# Patient Record
Sex: MALE | Race: WHITE | URBAN - METROPOLITAN AREA
[De-identification: names, ages, dates, MRNs, and addresses within clinical notes are randomized per-mention and may not be internally consistent; named-entity substitution may affect disease eponyms.]

---

## 2020-11-24 ENCOUNTER — OFFICE VISIT (OUTPATIENT)
Dept: URBAN - METROPOLITAN AREA CLINIC 48 | Facility: CLINIC | Age: 40
End: 2020-11-24
Payer: COMMERCIAL

## 2020-11-24 PROCEDURE — 92133 CPTRZD OPH DX IMG PST SGM ON: CPT | Performed by: OPHTHALMOLOGY

## 2020-11-24 PROCEDURE — 92020 GONIOSCOPY: CPT | Performed by: OPHTHALMOLOGY

## 2020-11-24 PROCEDURE — 92004 COMPRE OPH EXAM NEW PT 1/>: CPT | Performed by: OPHTHALMOLOGY

## 2020-11-24 PROCEDURE — 92083 EXTENDED VISUAL FIELD XM: CPT | Performed by: OPHTHALMOLOGY

## 2020-11-24 RX ORDER — LATANOPROST 50 UG/ML
0.005 % SOLUTION OPHTHALMIC
Qty: 2.5 | Refills: 6 | Status: INACTIVE
Start: 2020-11-24 | End: 2021-11-05

## 2020-11-24 ASSESSMENT — INTRAOCULAR PRESSURE
OD: 13
OS: 47

## 2020-11-24 ASSESSMENT — KERATOMETRY
OS: 47.50
OD: 46.75

## 2020-11-24 NOTE — ASSESSMENT/PLAN
Impression: Visual Field - OD: Good-normal; OS: Good-dense superior and inferior arcuate Plan: see plan

## 2020-11-24 NOTE — IMPRESSION/PLAN
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 Plan: Extremely high IOP OS, severe optic nerve damage. long discussion regarding prognosis. start Lataoprost qhs OS

IOP check in 1 week.

## 2020-12-03 ENCOUNTER — OFFICE VISIT (OUTPATIENT)
Dept: URBAN - METROPOLITAN AREA CLINIC 48 | Facility: CLINIC | Age: 40
End: 2020-12-03
Payer: COMMERCIAL

## 2020-12-03 PROCEDURE — 92012 INTRM OPH EXAM EST PATIENT: CPT | Performed by: OPHTHALMOLOGY

## 2020-12-03 PROCEDURE — 92134 CPTRZ OPH DX IMG PST SGM RTA: CPT | Performed by: OPHTHALMOLOGY

## 2020-12-03 RX ORDER — BRIMONIDINE TARTRATE, TIMOLOL MALEATE 2; 5 MG/ML; MG/ML
SOLUTION/ DROPS OPHTHALMIC
Qty: 5 | Refills: 6 | Status: INACTIVE
Start: 2020-12-03 | End: 2021-01-25

## 2020-12-03 NOTE — IMPRESSION/PLAN
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 Plan: IOP in OS not as high as before (47) but still very elevated (43) IOP did not seem to decrease as much as we were expecting. Recommend adding additional ocular drop to help reduce IOP. Patient to start: Combigan BID OS
continue Kelli QHS OS

 side effects of beta-blockers; dry eyes, red, stinging or painful eyes after using drops, blurry vision, breathing problems in people with asthma, emphysema, or COPD, a slow or irregular heartbeat, feeling tired, depression, dizziness Side effects of Alpha agonists; dry eye, red, stinging or painful eyes after using drops, blurry vision, allergy (redness, itching, tearing and swelling of the eye), a large (dilated) pupil, headaches, dry mouth, feeling tired, weak or dizzy, an increase in blood pressure, a fast or irregular heartbeat

## 2020-12-17 ENCOUNTER — OFFICE VISIT (OUTPATIENT)
Dept: URBAN - METROPOLITAN AREA CLINIC 48 | Facility: CLINIC | Age: 40
End: 2020-12-17
Payer: COMMERCIAL

## 2020-12-17 DIAGNOSIS — H20.812: Primary | ICD-10-CM

## 2020-12-17 PROCEDURE — 92012 INTRM OPH EXAM EST PATIENT: CPT | Performed by: OPHTHALMOLOGY

## 2020-12-17 RX ORDER — NETARSUDIL 0.2 MG/ML
0.02 % SOLUTION/ DROPS OPHTHALMIC; TOPICAL
Qty: 2.5 | Refills: 6 | Status: INACTIVE
Start: 2020-12-17 | End: 2021-11-05

## 2020-12-17 ASSESSMENT — INTRAOCULAR PRESSURE
OD: 22
OS: 38

## 2020-12-17 NOTE — IMPRESSION/PLAN
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 Plan: IOP slightly lower in OS @38, vs 43 at last visit. IOP very slowly decreasing. Discussed with patient that we are trying to use all drops possible to lower IOP, but  patient will likely need surgery or laser to help. Discussed risks/benefits to surgery. Continue Combigan BID OS, Latanoprost QHS OS Start: Rhopressa QHS OS - sample given today Common side effects of Rhopressa include: eye redness, corneal abnormalities, instillation site pain, and burst blood vessels in the eye.

## 2020-12-28 ENCOUNTER — OFFICE VISIT (OUTPATIENT)
Dept: URBAN - METROPOLITAN AREA CLINIC 48 | Facility: CLINIC | Age: 40
End: 2020-12-28
Payer: COMMERCIAL

## 2020-12-28 PROCEDURE — 92012 INTRM OPH EXAM EST PATIENT: CPT | Performed by: OPHTHALMOLOGY

## 2020-12-28 ASSESSMENT — INTRAOCULAR PRESSURE
OD: 17
OS: 33

## 2020-12-28 NOTE — IMPRESSION/PLAN
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 Plan: IOP much better in OD, slightly lower but still high in OS (33). Continue current regime at this time and monitor closely. 
Continue: Combigan BID OS, Kelli QHS OS, Rhopressa QHS OS

## 2021-01-19 ENCOUNTER — OFFICE VISIT (OUTPATIENT)
Dept: URBAN - METROPOLITAN AREA CLINIC 48 | Facility: CLINIC | Age: 41
End: 2021-01-19
Payer: COMMERCIAL

## 2021-01-19 PROCEDURE — 92020 GONIOSCOPY: CPT | Performed by: OPHTHALMOLOGY

## 2021-01-19 PROCEDURE — 99213 OFFICE O/P EST LOW 20 MIN: CPT | Performed by: OPHTHALMOLOGY

## 2021-01-19 RX ORDER — ACETAZOLAMIDE 250 MG/1
250 MG TABLET ORAL
Qty: 120 | Refills: 1 | Status: INACTIVE
Start: 2021-01-19 | End: 2021-08-10

## 2021-01-19 ASSESSMENT — INTRAOCULAR PRESSURE
OS: 52
OD: 17

## 2021-01-19 NOTE — IMPRESSION/PLAN
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 Plan: IOP very high in OS today @52. Advised patient at this point surgical intervention is strongly recommended:  Trabeculectomy w/ Express Shunt OS. Patient is expecting new baby and would like to wait until post-partum to do procedure. Will add additional IOP reducing medication and see if this helps. Continue: Kelli QHS OS, Rhopressa QHS OS, Combigan BID OS Start: Acetazolamide 250mg QID PO

## 2021-01-19 NOTE — IMPRESSION/PLAN
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  Plan: see plan 1

## 2021-01-28 ENCOUNTER — OFFICE VISIT (OUTPATIENT)
Dept: URBAN - METROPOLITAN AREA CLINIC 48 | Facility: CLINIC | Age: 41
End: 2021-01-28
Payer: COMMERCIAL

## 2021-01-28 PROCEDURE — 99213 OFFICE O/P EST LOW 20 MIN: CPT | Performed by: OPHTHALMOLOGY

## 2021-01-28 ASSESSMENT — INTRAOCULAR PRESSURE
OS: 25
OD: 15

## 2021-01-28 NOTE — IMPRESSION/PLAN
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 Plan: IOP much better, but still elevated OS. Informed patient he still needs to have surgery done, patient still waiting for wife to give birth Continue: Kelli QHS OS, Rhopressa QHS OS, Combigan BID OS, Acetazolamide 250mg QID PO

## 2021-08-10 ENCOUNTER — OFFICE VISIT (OUTPATIENT)
Dept: URBAN - METROPOLITAN AREA CLINIC 48 | Facility: CLINIC | Age: 41
End: 2021-08-10
Payer: COMMERCIAL

## 2021-08-10 DIAGNOSIS — H40.52X3 GLAUCOMA SECONDARY TO OTHER EYE DISORDERS, LEFT EYE, SEVERE STAGE: ICD-10-CM

## 2021-08-10 DIAGNOSIS — H21.262: Primary | ICD-10-CM

## 2021-08-10 PROCEDURE — 99213 OFFICE O/P EST LOW 20 MIN: CPT | Performed by: OPHTHALMOLOGY

## 2021-08-10 RX ORDER — OFLOXACIN 3 MG/ML
0.3 % SOLUTION/ DROPS OPHTHALMIC
Qty: 5 | Refills: 1 | Status: INACTIVE
Start: 2021-08-10 | End: 2021-12-07

## 2021-08-10 RX ORDER — PREDNISONE 20 MG/1
20 MG TABLET ORAL
Qty: 60 | Refills: 0 | Status: INACTIVE
Start: 2021-08-10 | End: 2021-08-23

## 2021-08-10 RX ORDER — PREDNISOLONE ACETATE 10 MG/ML
1 % SUSPENSION/ DROPS OPHTHALMIC
Qty: 5 | Refills: 3 | Status: INACTIVE
Start: 2021-08-10 | End: 2021-12-27

## 2021-08-10 ASSESSMENT — INTRAOCULAR PRESSURE: OS: 48

## 2021-08-10 NOTE — IMPRESSION/PLAN
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 Plan: IOP elevated in OS. Patient ready to have glaucoma surgery - Trabeculectomy w/ Express Shunt OS only. Discussed risks and benefits in detail with patient. Continue: Kelli QHS OS, Rhopressa QHS OS, Combigan BID OS Start: Prednisone 40mg Tab PO QD, Prednisolone QID OS - to start 1wk prior to procedure Recommend Trabeculectomy augmented with Express Shunt w/ mitomycin C for further lowering of intraocular pressure. Risks and benefits of the surgery explained. Patient understands that the purpose of the surgery is to lower eye pressure and not to improve vision. May need 5 fluorouracil subconjunctival injections post-operatively. Patient asked to consult with PCP or cardiologist and stop antiplatelet or anticoagulant medications 7 days prior to the surgery date if medically appropriate. Patient is to start Ofloxacin QID times a day one day prior to surgery. All potential side effects and benefits of medication discussed. Patient to bring in all drops to PO visits. Schedule Trabeculectomy w/ Express shunt w/ augmented with mitomycin C in LEFT eye, RL 2
patient instructed to use and take Pred for 1wk prior to surgery.

## 2021-08-18 ENCOUNTER — SURGERY (OUTPATIENT)
Dept: URBAN - METROPOLITAN AREA SURGERY 26 | Facility: SURGERY | Age: 41
End: 2021-08-18
Payer: COMMERCIAL

## 2021-08-18 PROCEDURE — 66170 GLAUCOMA SURGERY: CPT | Performed by: OPHTHALMOLOGY

## 2021-08-19 ENCOUNTER — POST-OPERATIVE VISIT (OUTPATIENT)
Dept: URBAN - METROPOLITAN AREA CLINIC 48 | Facility: CLINIC | Age: 41
End: 2021-08-19
Payer: COMMERCIAL

## 2021-08-19 PROCEDURE — 99024 POSTOP FOLLOW-UP VISIT: CPT | Performed by: OPHTHALMOLOGY

## 2021-08-19 ASSESSMENT — INTRAOCULAR PRESSURE: OS: 21

## 2021-08-19 NOTE — IMPRESSION/PLAN
Impression: S/P Trabeculectomy / EX-PRESS shunt/ Mitomycin C OS - 1 Day. Encounter for surgical aftercare following surgery on a sense organ  Z48.800. Plan: IOP better Use: Ofloxacin QID OS, Pred Q2hr OS Continue: Pred 40mg PO QD
d/c Kelli and Rhopressa RTC Monday IOP check/PO

## 2021-08-23 ENCOUNTER — POST-OPERATIVE VISIT (OUTPATIENT)
Dept: URBAN - METROPOLITAN AREA CLINIC 48 | Facility: CLINIC | Age: 41
End: 2021-08-23
Payer: COMMERCIAL

## 2021-08-23 PROCEDURE — 99024 POSTOP FOLLOW-UP VISIT: CPT | Performed by: OPHTHALMOLOGY

## 2021-08-23 RX ORDER — PREDNISONE 20 MG/1
20 MG TABLET ORAL
Qty: 60 | Refills: 0 | Status: INACTIVE
Start: 2021-08-23 | End: 2021-09-07

## 2021-08-23 ASSESSMENT — INTRAOCULAR PRESSURE: OS: 25

## 2021-08-23 NOTE — IMPRESSION/PLAN
Impression: S/P Trabeculectomy / EX-PRESS shunt/ Mitomycin C OS - 5 Days. Encounter for surgical aftercare following surgery on a sense organ  Z48.810. Plan: IOP elevated OS, 1 releasable suture removed, IOP @12 post suture removal
Continue: Pred Q2hr OS Use Ofloxacin QID for a couple more days then d/c Increase Pred to 60mg PO QD

RTC Thursday PO/IOP check

## 2021-08-26 ENCOUNTER — POST-OPERATIVE VISIT (OUTPATIENT)
Dept: URBAN - METROPOLITAN AREA CLINIC 48 | Facility: CLINIC | Age: 41
End: 2021-08-26
Payer: COMMERCIAL

## 2021-08-26 PROCEDURE — 99024 POSTOP FOLLOW-UP VISIT: CPT | Performed by: OPHTHALMOLOGY

## 2021-08-26 RX ORDER — ATROPINE SULFATE 10 MG/ML
1 % SOLUTION/ DROPS OPHTHALMIC
Qty: 5 | Refills: 0 | Status: INACTIVE
Start: 2021-08-26 | End: 2021-11-05

## 2021-08-26 ASSESSMENT — INTRAOCULAR PRESSURE: OS: 1

## 2021-08-26 NOTE — IMPRESSION/PLAN
Impression: S/P Trabeculectomy / EX-PRESS shunt/ Mitomycin C OS - 8 Days.  Encounter for surgical aftercare following surgery on a sense organ  Z48.810.
continue predforte every 2 hours, add atropine bid OS, continue oral prednisone 60 mg a day Plan: Follow up at 11 am tomorrow with Dr Krista Urbina

## 2021-08-26 NOTE — IMPRESSION/PLAN
Impression: S/P Trabeculectomy / EX-PRESS shunt/ Mitomycin C OS - 8 Days. Encounter for surgical aftercare following surgery on a sense organ  Z48.810. Plan: IOP very low now, possibly due to suture removed at last visit
will need to administer Provisc injection to inflate eye Start: Atropine BID OS Continue: Pred Q2hr OS, Pred 60mg PO QD

## 2021-08-27 ENCOUNTER — POST-OPERATIVE VISIT (OUTPATIENT)
Dept: URBAN - METROPOLITAN AREA CLINIC 48 | Facility: CLINIC | Age: 41
End: 2021-08-27
Payer: COMMERCIAL

## 2021-08-27 PROCEDURE — 99024 POSTOP FOLLOW-UP VISIT: CPT | Performed by: OPHTHALMOLOGY

## 2021-08-27 ASSESSMENT — INTRAOCULAR PRESSURE: OS: 16

## 2021-08-27 NOTE — IMPRESSION/PLAN
Impression: S/P Trabeculectomy / EX-PRESS shunt/ Mitomycin C OS - 9 Days. Encounter for surgical aftercare following surgery on a sense organ  Z48.280. Plan: IOP much better today Continue: Pred Q2H OS, Ofloxacin QID OS, Atropine BID OS, Oral Pred 60mg tab PO QD

RTC Monday IOP check /PO

## 2021-08-30 ENCOUNTER — POST-OPERATIVE VISIT (OUTPATIENT)
Dept: URBAN - METROPOLITAN AREA CLINIC 48 | Facility: CLINIC | Age: 41
End: 2021-08-30
Payer: COMMERCIAL

## 2021-08-30 PROCEDURE — 99024 POSTOP FOLLOW-UP VISIT: CPT | Performed by: OPHTHALMOLOGY

## 2021-08-30 ASSESSMENT — INTRAOCULAR PRESSURE: OS: 13

## 2021-08-30 NOTE — IMPRESSION/PLAN
Impression: S/P Trabeculectomy / EX-PRESS shunt/ Mitomycin C OS - 12 Days. Encounter for surgical aftercare following surgery on a sense organ  Z48.810. Plan: IOP steady, continues to be within good range d/c Ofloxacin at this time Reduce: Pred to 6x daily OS Continue: Atropine BID OS Start: Prednisone Tab taper 50mg x1wk, 40mg x1wk, 30mg x1wk, 20mg x1wk, 10mg x1wk, then d/c

RTC 1wk PO

## 2021-09-07 ENCOUNTER — POST-OPERATIVE VISIT (OUTPATIENT)
Dept: URBAN - METROPOLITAN AREA CLINIC 48 | Facility: CLINIC | Age: 41
End: 2021-09-07
Payer: COMMERCIAL

## 2021-09-07 PROCEDURE — 99024 POSTOP FOLLOW-UP VISIT: CPT | Performed by: OPHTHALMOLOGY

## 2021-09-07 RX ORDER — PREDNISONE 20 MG/1
20 MG TABLET ORAL
Qty: 60 | Refills: 0 | Status: INACTIVE
Start: 2021-09-07 | End: 2021-10-14

## 2021-09-07 ASSESSMENT — INTRAOCULAR PRESSURE: OS: 13

## 2021-09-07 NOTE — IMPRESSION/PLAN
Impression: S/P Trabeculectomy / EX-PRESS shunt/ Mitomycin C OS - 20 Days. Encounter for surgical aftercare following surgery on a sense organ  Z48.290. Plan: IOP at good range OD, continue to monitor d/c Atropine Continue: Pred 6x daily OS Continue to pater: Oral Prednisone 40mg x1wk, 30mg x1wk, 20mg x1wk, 10mg x1wk, then d/c

RTC 1wk PO

## 2021-09-14 ENCOUNTER — POST-OPERATIVE VISIT (OUTPATIENT)
Dept: URBAN - METROPOLITAN AREA CLINIC 48 | Facility: CLINIC | Age: 41
End: 2021-09-14
Payer: COMMERCIAL

## 2021-09-14 PROCEDURE — 99024 POSTOP FOLLOW-UP VISIT: CPT | Performed by: OPHTHALMOLOGY

## 2021-09-14 ASSESSMENT — INTRAOCULAR PRESSURE: OS: 16

## 2021-09-14 NOTE — IMPRESSION/PLAN
Impression: S/P Trabeculectomy / EX-PRESS shunt/ Mitomycin C OS - 27 Days. Encounter for surgical aftercare following surgery on a sense organ  Z48.810. Plan: subconjunctival 5 FU today, continue tapering predforte and oral prednisone. 


RTC next Thursday PM PO/IOP check

## 2021-09-27 ENCOUNTER — POST-OPERATIVE VISIT (OUTPATIENT)
Dept: URBAN - METROPOLITAN AREA CLINIC 48 | Facility: CLINIC | Age: 41
End: 2021-09-27
Payer: COMMERCIAL

## 2021-09-27 PROCEDURE — 99024 POSTOP FOLLOW-UP VISIT: CPT | Performed by: OPHTHALMOLOGY

## 2021-09-27 ASSESSMENT — INTRAOCULAR PRESSURE: OS: 15

## 2021-09-27 NOTE — IMPRESSION/PLAN
Impression: S/P Trabeculectomy / EX-PRESS shunt/ Mitomycin C OS - 40 Days. Encounter for surgical aftercare following surgery on a sense organ  Z48.600.  Plan: IOP stable in OS
start Prednisolone taper 3x2x1x then d/c
continue: Oral Pred taper 20mg x1wk, 10mg x1wk, 5mg 1wk then d/c

RTC 3wk PO/IOP check

## 2021-10-14 ENCOUNTER — POST-OPERATIVE VISIT (OUTPATIENT)
Dept: URBAN - METROPOLITAN AREA CLINIC 48 | Facility: CLINIC | Age: 41
End: 2021-10-14
Payer: COMMERCIAL

## 2021-10-14 PROCEDURE — 99024 POSTOP FOLLOW-UP VISIT: CPT | Performed by: STUDENT IN AN ORGANIZED HEALTH CARE EDUCATION/TRAINING PROGRAM

## 2021-10-14 RX ORDER — PREDNISONE 20 MG/1
20 MG TABLET ORAL
Qty: 60 | Refills: 0 | Status: INACTIVE
Start: 2021-10-14 | End: 2022-01-26

## 2021-10-14 ASSESSMENT — INTRAOCULAR PRESSURE
OS: 2
OS: 47

## 2021-10-14 NOTE — IMPRESSION/PLAN
Impression: S/P Trabeculectomy / EX-PRESS shunt/ Mitomycin C OS - 57 Days. Encounter for surgical aftercare following surgery on a sense organ  Z48.810. Post operative instructions reviewed - Plan: IOP elevated today at 47.
1 suture removed in office today. Pressure decreased to 7. Discussed with the patient. Start Prednisolone 6 times daily.  Start Atropine BID OS

RTC this afternoon for PO (IOP Check)

## 2021-10-14 NOTE — IMPRESSION/PLAN
Impression: S/P Trabeculectomy / EX-PRESS shunt/ Mitomycin C OS - 57 Days. Encounter for surgical aftercare following surgery on a sense organ  Z48.810.
sub conj few 5 mg/.1  ml Plan: OS Increase 40 mg. Pred. Increase PF gtts. for IOP to Q2H Re-start Atropine BID 

-Discussed R/B/A inj. to prevent scarring

## 2021-10-18 ENCOUNTER — POST-OPERATIVE VISIT (OUTPATIENT)
Dept: URBAN - METROPOLITAN AREA CLINIC 48 | Facility: CLINIC | Age: 41
End: 2021-10-18
Payer: COMMERCIAL

## 2021-10-18 PROCEDURE — 99024 POSTOP FOLLOW-UP VISIT: CPT | Performed by: OPHTHALMOLOGY

## 2021-10-18 ASSESSMENT — INTRAOCULAR PRESSURE: OS: 54

## 2021-10-18 NOTE — IMPRESSION/PLAN
Impression: S/P Trabeculectomy / EX-PRESS shunt/ Mitomycin C OS - 61 Days. Encounter for surgical aftercare following surgery on a sense organ  Z48.410. Plan: IOP continues to be elevated in OS - 54 OS Advised patient Bleb is scarring down, recommend redoing needling and doing 5fu inj
Bleb needling w/ 5FU inj - IOP @5 post
d/c Atropine Continue: Pred Q2HR OS, Pred 40mg tab PO Restart: Ofloxacin BID OS

RTC Thursday PO/IOP Check

## 2021-10-20 ENCOUNTER — POST-OPERATIVE VISIT (OUTPATIENT)
Dept: URBAN - METROPOLITAN AREA CLINIC 48 | Facility: CLINIC | Age: 41
End: 2021-10-20
Payer: COMMERCIAL

## 2021-10-20 PROCEDURE — 99024 POSTOP FOLLOW-UP VISIT: CPT | Performed by: OPHTHALMOLOGY

## 2021-10-20 ASSESSMENT — INTRAOCULAR PRESSURE: OS: 55

## 2021-10-20 NOTE — IMPRESSION/PLAN
Impression: S/P Trabeculectomy / EX-PRESS shunt/ Mitomycin C OS - 63 Days. Encounter for surgical aftercare following surgery on a sense organ  Z48.810. Plan: IOP continues to be elevated OS post Trab Discussed revising/redoing surgery Continue: Pred 6x daily OS, Pred 40mg tab PO Restart: Combigan BID OS, Latanoprost QHS OS, Acetazolamide 250mg tab PO Revision of Trabeculectomy OS w/ [address] - to be done next week Risk of further corneal decompensation. Risk of infection, bleeding and rarely completely losing the eye.  
Please Schedule Trabeculectomy w/ [address]; revision LEFT EYE RL 2

## 2021-10-25 ENCOUNTER — POST-OPERATIVE VISIT (OUTPATIENT)
Dept: URBAN - METROPOLITAN AREA CLINIC 48 | Facility: CLINIC | Age: 41
End: 2021-10-25
Payer: COMMERCIAL

## 2021-10-25 PROCEDURE — 99024 POSTOP FOLLOW-UP VISIT: CPT | Performed by: STUDENT IN AN ORGANIZED HEALTH CARE EDUCATION/TRAINING PROGRAM

## 2021-10-25 ASSESSMENT — INTRAOCULAR PRESSURE: OS: 53

## 2021-10-25 NOTE — IMPRESSION/PLAN
Impression: S/P Trabeculectomy / EX-PRESS shunt/ Mitomycin C OS - 68 Days. Encounter for surgical aftercare following surgery on a sense organ  Z48.352. Plan: Continue: 
Pred forte 6x daily os
combigan bid os Latanoprost qhs os Prednisolone 40mg qd po Diamox 250mg qid po Restart ABX Oflox 4xday

## 2021-10-27 ENCOUNTER — POST-OPERATIVE VISIT (OUTPATIENT)
Dept: URBAN - METROPOLITAN AREA CLINIC 48 | Facility: CLINIC | Age: 41
End: 2021-10-27
Payer: COMMERCIAL

## 2021-10-27 PROCEDURE — 99024 POSTOP FOLLOW-UP VISIT: CPT | Performed by: OPHTHALMOLOGY

## 2021-10-27 ASSESSMENT — INTRAOCULAR PRESSURE: OS: 42

## 2021-11-01 ENCOUNTER — POST-OPERATIVE VISIT (OUTPATIENT)
Dept: URBAN - METROPOLITAN AREA CLINIC 48 | Facility: CLINIC | Age: 41
End: 2021-11-01
Payer: COMMERCIAL

## 2021-11-01 DIAGNOSIS — Z48.810 ENCOUNTER FOR SURGICAL AFTERCARE FOLLOWING SURGERY ON A SENSE ORGAN: Primary | ICD-10-CM

## 2021-11-01 PROCEDURE — 99024 POSTOP FOLLOW-UP VISIT: CPT | Performed by: OPHTHALMOLOGY

## 2021-11-01 ASSESSMENT — INTRAOCULAR PRESSURE: OS: 52

## 2021-11-01 NOTE — IMPRESSION/PLAN
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 Plan: Will schedule Tube shunt Placement 
possibility of using one of three shunts, Baeverldt, Molteno and Ahmed. Risks and benefits of the surgery explained. Patient understands that the purpose of the surgery is to lower eye pressure and not to improve vision. Patient asked to consult with PCP or cardiologist and stop antiplatelet or anticoagulant medications 7 days prior to the surgery date if medically appropriate. Patient is to start Prednisolone QID and Ofloxacin QID times a day one day prior to surgery. All potential side effects and benefits of medication discussed. Patient to bring in all drops to PO visits. 

Schedule Tube shunt surgery+/- scleral reinforcement in LEFT eye, RL2

## 2021-11-01 NOTE — IMPRESSION/PLAN
Impression: S/P Bleb needling w/ 5FU inj OS - 14 Days. Encounter for surgical aftercare following surgery on a sense organ  Z48.810. Plan: IOP continues elevated in OS Advised patient that blood vessel running across OS is very large May need to do different surgery - repeat Trabeculectomy in other side. Discussed risk of same thing happening to this side Continue: Pred 6x daily OD, Combigan BID OS, Latanoprost QHS OS Continue Oral: Pred 40mg QD PO, Diamox 250mg QID PO Will schedule Tube shunt Placement 
possibility of using one of three shunts, Baeverldt, Molteno and Ahmed. Risks and benefits of the surgery explained. Patient understands that the purpose of the surgery is to lower eye pressure and not to improve vision. Patient asked to consult with PCP or cardiologist and stop antiplatelet or anticoagulant medications 7 days prior to the surgery date if medically appropriate. Patient is to start Prednisolone QID and Ofloxacin QID times a day one day prior to surgery. All potential side effects and benefits of medication discussed. Patient to bring in all drops to PO visits. 

Schedule Tube shunt surgery+/- scleral reinforcement in LEFT eye, RL2

## 2021-11-02 ENCOUNTER — SURGERY (OUTPATIENT)
Dept: URBAN - METROPOLITAN AREA SURGERY 26 | Facility: SURGERY | Age: 41
End: 2021-11-02
Payer: COMMERCIAL

## 2021-11-02 PROCEDURE — 66180 AQUEOUS SHUNT EYE W/GRAFT: CPT | Performed by: OPHTHALMOLOGY

## 2021-11-03 ENCOUNTER — POST-OPERATIVE VISIT (OUTPATIENT)
Dept: URBAN - METROPOLITAN AREA CLINIC 48 | Facility: CLINIC | Age: 41
End: 2021-11-03
Payer: COMMERCIAL

## 2021-11-03 PROCEDURE — 99024 POSTOP FOLLOW-UP VISIT: CPT | Performed by: OPHTHALMOLOGY

## 2021-11-03 RX ORDER — HYDROCODONE BITARTRATE AND ACETAMINOPHEN 5; 325 MG/1; MG/1
TABLET ORAL
Qty: 20 | Refills: 0 | Status: ACTIVE
Start: 2021-11-03

## 2021-11-03 ASSESSMENT — INTRAOCULAR PRESSURE: OS: 41

## 2021-11-03 NOTE — IMPRESSION/PLAN
Croup    Your toddler has a harsh cough that gets worse in the evening. Now shes woken up gasping for air. Chances are she has croup. This is an infection of the voice box (larynx) and windpipe (trachea). Croup causes the airways to swell, making it hard to breathe. It also causes a cough that can sound something like a seal barking.  Causes of croup  Croup mainly affects children between 6 months and 3 years of age, especially children younger than 2 years. But it can occur up to age 6. Older children have larger airways, so swelling isnt as likely to affect their breathing. Croup often follows a cold. It is usually caused by a virus and is most common between October and March.  When to go the emergency department  Mild croup can usually be treated at home with the home care methods listed below. Call your health care provider right away if you suspect croup. Take your child to the ER or a special emergency respiratory clinic if he or she has moderate to severe croup. And seek emergency care if youre worried, or if your child:  · Makes a whistling sound (stridor) that becomes louder with each breath.  · Has stridor when resting  · Has a hard time swallowing his or her saliva or drools  · Has increased difficulty breathing  · Has a blue or dusky color around the fingernails, mouth, or nose  · Struggles to catch his or her breath  · Can't speak or make sounds  What to expect in the emergency department  A doctor will ask about your childs health history and listen to his or her breathing. Your child may be given a medicine that usually relieves swollen airways and other symptoms. In rare cases, the doctor may use a tube to help your child breathe.  Home care for croup  Croup can sound frightening. But in many cases, the following tips can help ease your child's breathing:  · Don't let anyone smoke in your home. Smoke can make your child's cough worse.  · Keep your child's head raised. Prop an older child up in  Impression: S/P Shunt:  Ahmed; CorneaGen; Scleral reinforcement OS - 1 Day. Encounter for surgical aftercare following surgery on a sense organ  Z48.810. Plan: IOP continues to be elevated post Shunt placement AC tap and Viscoelastic removed Use: Pred 6x daily OD, Ofloxacin QID OD
D/c: Combigan, Latanoprost
Oral Med: Pred 40mg QD PO Reduce: Diamox to 125mg QID PO "bed with extra pillows. Put an infant in a car seat. Never use pillows with an infant younger than 12 months old.  · Sleep in the same room as your child while he or she is sick. You will be able to help your child right away if he or she has trouble breathing.  · Stay calm. If your child sees that you are frightened, this will make your child more anxious and make it harder for him or her to breathe.  · Offer words of comfort such as "It will be OK. I'm right here with you."  · Sing your child's favorite bedtime song.  · Offer a back rub or hold your child.  · Offer a favorite toy.  If the above tips don't help your child's breathing, you may try having your child breathe in steam from a shower or cool, moist night air. According to the American Academy of Pediatrics and the American Academy of Family Physicians, no studies prove that inhaling steam or moist air helps a child's breathing. But other medical experts still support this approach. Here's what to do:  · Turn on the hot water in your bathroom shower.  · Keep the door closed, so the room gets steamy.  · Sit with your child in the steam for 15 or 20 minutes. Don't leave your child alone.  · If your child wakes up at night, you can take him or her outdoors to breathe in cool night air. Make sure to wrap your child in warm clothing or blankets if the weather is chilly.   Date Last Reviewed: 10/1/2016  © 5618-0023 [employer], [employer]. [address] [place] [address], [place] [address]. All rights reserved. This information is not intended as a substitute for professional medical care. Always follow your healthcare professional's instructions.        "

## 2021-11-04 ENCOUNTER — POST-OPERATIVE VISIT (OUTPATIENT)
Dept: URBAN - METROPOLITAN AREA CLINIC 48 | Facility: CLINIC | Age: 41
End: 2021-11-04
Payer: COMMERCIAL

## 2021-11-04 PROCEDURE — 99024 POSTOP FOLLOW-UP VISIT: CPT | Performed by: OPHTHALMOLOGY

## 2021-11-04 ASSESSMENT — INTRAOCULAR PRESSURE
OS: 34
OS: 50
OS: 15
OS: 22
OS: 27

## 2021-11-04 NOTE — IMPRESSION/PLAN
Impression: S/P Shunt:  Ahmed; CorneaGen; Scleral reinforcement OS - 2 Days. Encounter for surgical aftercare following surgery on a sense organ  Z48.810. Plan: IOP elevated OS, better after burp - made slightly larger opening to allow fluid to drain Ocular surface irritated due to all drops being used Reduce: Pred to QID OD
d/c Ofloxacin Continue Oral: Pred 40mg PO, Diamox 125mg QID PO
IOP @ 28 post burp, then @17 Will have patient remain in clinic for IOP check in 30min, then 1hr 

Discussed possibly going to ER to have Manitol infusion to help reduce IOP.

## 2021-11-05 ENCOUNTER — POST-OPERATIVE VISIT (OUTPATIENT)
Dept: URBAN - METROPOLITAN AREA CLINIC 48 | Facility: CLINIC | Age: 41
End: 2021-11-05
Payer: COMMERCIAL

## 2021-11-05 PROCEDURE — 99024 POSTOP FOLLOW-UP VISIT: CPT | Performed by: STUDENT IN AN ORGANIZED HEALTH CARE EDUCATION/TRAINING PROGRAM

## 2021-11-05 NOTE — IMPRESSION/PLAN
Impression: S/P Shunt:  Ahmed; CorneaGen; Scleral reinforcement OS - 3 Days. Encounter for surgical aftercare following surgery on a sense organ  Z48.810. Plan: [facility] IOP 1st check 1 4  2nd  18 Tonopen 1st 20, 2nd 21, 3rd 20 Continue curerrent regimen. OS
PF 6X a day OFLOX QID Diamox QID PO Prednisone 40mg PO QD

RTC Monday with Dr. Bowers Bitter

## 2021-11-08 ENCOUNTER — POST-OPERATIVE VISIT (OUTPATIENT)
Dept: URBAN - METROPOLITAN AREA CLINIC 48 | Facility: CLINIC | Age: 41
End: 2021-11-08
Payer: COMMERCIAL

## 2021-11-08 PROCEDURE — 99024 POSTOP FOLLOW-UP VISIT: CPT | Performed by: OPHTHALMOLOGY

## 2021-11-08 ASSESSMENT — INTRAOCULAR PRESSURE: OS: 26

## 2021-11-08 NOTE — IMPRESSION/PLAN
Impression: S/P Shunt:  Ahmed; CorneaGen; Scleral reinforcement OS - 6 Days. Encounter for surgical aftercare following surgery on a sense organ  Z48.810.  Post operative instructions reviewed - Condition is improving -

-Combigan bid OS
- Prednisone 30 mg x 1 week then 20 mg x 1 week then 
-PF 6 times a say OS Plan: RTC Wednesday  PO

## 2021-11-10 ENCOUNTER — POST-OPERATIVE VISIT (OUTPATIENT)
Dept: URBAN - METROPOLITAN AREA CLINIC 48 | Facility: CLINIC | Age: 41
End: 2021-11-10
Payer: COMMERCIAL

## 2021-11-10 PROCEDURE — 99024 POSTOP FOLLOW-UP VISIT: CPT | Performed by: OPHTHALMOLOGY

## 2021-11-10 ASSESSMENT — INTRAOCULAR PRESSURE: OS: 10

## 2021-11-10 NOTE — IMPRESSION/PLAN
Impression: S/P Shunt:  Ahmed; CorneaGen; Scleral reinforcement OS - 8 Days. Encounter for surgical aftercare following surgery on a sense organ  Z48.810. Plan: IOP improvement OS, better d/c Combigan Reduce: Pred to QID OS Continue: Prednisone PO taper, Ofloxacin QID OS Start: Atropine BID OS
no vigorous activity RTC Friday AM PO

## 2021-11-12 ENCOUNTER — POST-OPERATIVE VISIT (OUTPATIENT)
Dept: URBAN - METROPOLITAN AREA CLINIC 48 | Facility: CLINIC | Age: 41
End: 2021-11-12
Payer: COMMERCIAL

## 2021-11-12 PROCEDURE — 99024 POSTOP FOLLOW-UP VISIT: CPT | Performed by: OPHTHALMOLOGY

## 2021-11-12 ASSESSMENT — INTRAOCULAR PRESSURE: OS: 18

## 2021-11-12 NOTE — IMPRESSION/PLAN
Impression: S/P Shunt:  Ahmed; CorneaGen; Scleral reinforcement OS - 10 Days. Encounter for surgical aftercare following surgery on a sense organ  Z48.810. Plan: IOP within acceptable range OS, but seems to be slowly building up Will continue same treatment plan
Continue: Atropine BID OS, Pred 6x daily OS, Pred 30mg PO QD
d/c Ofloxacin RTC Monday for IOP check

## 2021-11-15 ENCOUNTER — POST-OPERATIVE VISIT (OUTPATIENT)
Dept: URBAN - METROPOLITAN AREA CLINIC 48 | Facility: CLINIC | Age: 41
End: 2021-11-15
Payer: COMMERCIAL

## 2021-11-15 PROCEDURE — 99024 POSTOP FOLLOW-UP VISIT: CPT | Performed by: OPHTHALMOLOGY

## 2021-11-15 ASSESSMENT — INTRAOCULAR PRESSURE
OS: 13
OD: 24

## 2021-11-15 NOTE — IMPRESSION/PLAN
Impression: S/P Shunt:  Ahmed; CorneaGen; Scleral reinforcement OS - 13 Days. Encounter for surgical aftercare following surgery on a sense organ  Z48.810. Plan: IOP increased in OD, within good range in OS
possible steroid response in OD due to being on Pred
dehiscence noted in conjunctiva, advised to use shield while sleeping Use: Combigan BID OD - while on Pred Reduce: Pred [id number] Continue: Oral Prednisone Taper 20mg PO, Atropine Use: Ofloxacin BID OS
RTC Wednesday AM IOP/PO check

## 2021-11-17 ENCOUNTER — POST-OPERATIVE VISIT (OUTPATIENT)
Dept: URBAN - METROPOLITAN AREA CLINIC 48 | Facility: CLINIC | Age: 41
End: 2021-11-17
Payer: COMMERCIAL

## 2021-11-17 PROCEDURE — 99024 POSTOP FOLLOW-UP VISIT: CPT | Performed by: OPHTHALMOLOGY

## 2021-11-17 RX ORDER — TOBRAMYCIN AND DEXAMETHASONE 3; 1 MG/G; MG/G
OINTMENT OPHTHALMIC
Qty: 3.5 | Refills: 2 | Status: INACTIVE
Start: 2021-11-17 | End: 2021-12-16

## 2021-11-17 NOTE — IMPRESSION/PLAN
Impression: S/P Shunt:  Ahmed; CorneaGen; Scleral reinforcement OS - 15 Days. Encounter for surgical aftercare following surgery on a sense organ  Z48.810. Plan: IOP within good range OU
blepharitis noted OS, advised to do warm compress BID OS Reduce: Atropine to QD OS Continue taper: Pred [id number], Oral Pred by 5mg weekly - currently on Pred 20mg PO Start: Tobradex QHS OS Continue: Ofloxacin BID OS Continue in OD: Combigan BID OD

RTC 1wk w/ Dr. Charlee Weber **continue Pred taper, if AC well formed, consider stopping Atropine

## 2021-11-23 ENCOUNTER — POST-OPERATIVE VISIT (OUTPATIENT)
Dept: URBAN - METROPOLITAN AREA CLINIC 48 | Facility: CLINIC | Age: 41
End: 2021-11-23
Payer: COMMERCIAL

## 2021-11-23 PROCEDURE — 99024 POSTOP FOLLOW-UP VISIT: CPT | Performed by: STUDENT IN AN ORGANIZED HEALTH CARE EDUCATION/TRAINING PROGRAM

## 2021-11-23 ASSESSMENT — INTRAOCULAR PRESSURE
OD: 20
OS: 18

## 2021-11-23 NOTE — IMPRESSION/PLAN
Impression: S/P Shunt:  Ahmed; CorneaGen; Scleral reinforcement OS - 21 Days. Encounter for surgical aftercare following surgery on a sense organ  Z48.810. Plan: Left eye is doing well. Will continue current regimen. OS: 
Continue Atropine QD Continue  Pred taper QID Continue  Tobradex QD  

PO Continue Pred 10mg RTC 1-2 wks.

## 2021-11-29 ENCOUNTER — POST-OPERATIVE VISIT (OUTPATIENT)
Dept: URBAN - METROPOLITAN AREA CLINIC 48 | Facility: CLINIC | Age: 41
End: 2021-11-29
Payer: COMMERCIAL

## 2021-11-29 PROCEDURE — 99024 POSTOP FOLLOW-UP VISIT: CPT | Performed by: STUDENT IN AN ORGANIZED HEALTH CARE EDUCATION/TRAINING PROGRAM

## 2021-11-29 ASSESSMENT — INTRAOCULAR PRESSURE
OD: 15
OS: 12

## 2021-11-29 NOTE — IMPRESSION/PLAN
Impression: S/P Shunt:  Ahmed; CorneaGen; Scleral reinforcement OS - 27 Days. Encounter for surgical aftercare following surgery on a sense organ  Z48.810. Plan: Slight increase risk of infection will increase Oflox QID, no wound leak appreciate w no pressure but mild pressure can cause small leak. OS Continue Atropine QD, Pred TID  and Oral Pred 5 mg
- discussed w/pt. dilation will persist for at lest 2 wks. after d/c
 

RTC 1 wk. w/Dr. Emelia Lawson or Dr. Gaviota Shukla if he is not in clinic RTC 2 wks.  w/Dr. Emelia Lawson

## 2021-12-07 ENCOUNTER — POST-OPERATIVE VISIT (OUTPATIENT)
Dept: URBAN - METROPOLITAN AREA CLINIC 48 | Facility: CLINIC | Age: 41
End: 2021-12-07
Payer: COMMERCIAL

## 2021-12-07 PROCEDURE — 99024 POSTOP FOLLOW-UP VISIT: CPT | Performed by: STUDENT IN AN ORGANIZED HEALTH CARE EDUCATION/TRAINING PROGRAM

## 2021-12-07 RX ORDER — OFLOXACIN 3 MG/ML
0.3 % SOLUTION/ DROPS OPHTHALMIC
Qty: 5 | Refills: 1 | Status: INACTIVE
Start: 2021-12-07 | End: 2021-12-30

## 2021-12-07 ASSESSMENT — INTRAOCULAR PRESSURE
OD: 16
OS: 21

## 2021-12-07 NOTE — IMPRESSION/PLAN
Impression: S/P Shunt:  Ahmed; CorneaGen; Scleral reinforcement OS - 35 Days. Encounter for surgical aftercare following surgery on a sense organ  Z48.810. Plan: OS Continue current regimen, discussed w/pt. current trace wound leak w/pressure. Overall, IOP and vision doing well. PF TID Atropine QD Oflox QID  (refill sent to pharmacy) OD Combigan BID 

PO
prednisone 5mg

## 2021-12-16 ENCOUNTER — POST-OPERATIVE VISIT (OUTPATIENT)
Dept: URBAN - METROPOLITAN AREA CLINIC 48 | Facility: CLINIC | Age: 41
End: 2021-12-16
Payer: COMMERCIAL

## 2021-12-16 PROCEDURE — 99024 POSTOP FOLLOW-UP VISIT: CPT | Performed by: OPHTHALMOLOGY

## 2021-12-16 ASSESSMENT — INTRAOCULAR PRESSURE
OD: 22
OS: 21

## 2021-12-16 NOTE — IMPRESSION/PLAN
Impression: S/P Shunt:  Ahmed; CorneaGen; Scleral reinforcement OS - 44 Days. Encounter for surgical aftercare following surgery on a sense organ  Z48.810. Plan: IOP within acceptable range OU Advised patient to d/c Atropine, Combigan Continue: Pred QD OS Start: Latanoprost QHS OD Reduce: Ofloxacin to QD OS

RTC 2wk PO

## 2021-12-30 ENCOUNTER — POST-OPERATIVE VISIT (OUTPATIENT)
Dept: URBAN - METROPOLITAN AREA CLINIC 48 | Facility: CLINIC | Age: 41
End: 2021-12-30
Payer: COMMERCIAL

## 2021-12-30 PROCEDURE — 99024 POSTOP FOLLOW-UP VISIT: CPT | Performed by: OPHTHALMOLOGY

## 2021-12-30 RX ORDER — BRIMONIDINE TARTRATE, TIMOLOL MALEATE 2; 5 MG/ML; MG/ML
SOLUTION/ DROPS OPHTHALMIC
Qty: 5 | Refills: 4 | Status: INACTIVE
Start: 2021-12-30 | End: 2022-01-05

## 2021-12-30 NOTE — IMPRESSION/PLAN
Impression: S/P Shunt:  Ahmed; CorneaGen; Scleral reinforcement OS - 58 Days. Encounter for surgical aftercare following surgery on a sense organ  Z48.810. Plan: IOP within good range OD, slightly elevated in OS
discussed options with patient: removing prolene stent vs adding IOP lowering drops Start: Combigan QHS OU
d/c Ofloxacin Continue: Pred QD OS Finish: Latanoprost QHS OD, then switch to Combigan

## 2022-01-26 ENCOUNTER — POST-OPERATIVE VISIT (OUTPATIENT)
Dept: URBAN - METROPOLITAN AREA CLINIC 48 | Facility: CLINIC | Age: 42
End: 2022-01-26
Payer: COMMERCIAL

## 2022-01-26 PROCEDURE — 99024 POSTOP FOLLOW-UP VISIT: CPT | Performed by: OPHTHALMOLOGY

## 2022-01-26 RX ORDER — LATANOPROST 50 UG/ML
0.005 % SOLUTION OPHTHALMIC
Qty: 2.5 | Refills: 6 | Status: ACTIVE
Start: 2022-01-26

## 2022-01-26 ASSESSMENT — INTRAOCULAR PRESSURE
OD: 17
OS: 21

## 2022-01-26 NOTE — IMPRESSION/PLAN
Impression: S/P Shunt:  Ahmed; CorneaGen; Scleral reinforcement OS - 85 Days. Encounter for surgical aftercare following surgery on a sense organ  Z48.810. Plan: IOP within good range OS Advised patient it is OK to remove stent now, may help reduce IOP Patient agrees with plan Use: Latanoprost QHS OS
d/c  Combigan to OS, continue BID OD Restart: Ofloxacin BID OS - due to exposed tube RTC Tuesday Follow up, stent removal

## 2022-02-01 ENCOUNTER — POST-OPERATIVE VISIT (OUTPATIENT)
Dept: URBAN - METROPOLITAN AREA CLINIC 48 | Facility: CLINIC | Age: 42
End: 2022-02-01
Payer: COMMERCIAL

## 2022-02-01 PROCEDURE — 99024 POSTOP FOLLOW-UP VISIT: CPT | Performed by: OPHTHALMOLOGY

## 2022-02-01 ASSESSMENT — INTRAOCULAR PRESSURE: OS: 16

## 2022-02-01 NOTE — IMPRESSION/PLAN
Impression: S/P Shunt:  Ahmed; CorneaGen; Scleral reinforcement OS - 91 Days. Encounter for surgical aftercare following surgery on a sense organ  Z48.810. Post operative instructions reviewed - Condition is improving -
PF bid OS
OFL bis OS
no glaucoma drops to OS Combigan bid OD Latanoprost qhs OD  Plan: RTC  1 week IOP check

## 2022-02-08 ENCOUNTER — POST-OPERATIVE VISIT (OUTPATIENT)
Dept: URBAN - METROPOLITAN AREA CLINIC 48 | Facility: CLINIC | Age: 42
End: 2022-02-08
Payer: COMMERCIAL

## 2022-02-08 PROCEDURE — 99024 POSTOP FOLLOW-UP VISIT: CPT | Performed by: OPHTHALMOLOGY

## 2022-02-08 ASSESSMENT — INTRAOCULAR PRESSURE
OD: 15
OS: 28

## 2022-02-08 NOTE — IMPRESSION/PLAN
Impression: S/P Shunt:  Ahmed; CorneaGen; Scleral reinforcement OS - 98 Days. Encounter for surgical aftercare following surgery on a sense organ  Z48.810. Plan: IOP elevated in OS, within good range in OD Advised patient since IOP is elevated, it would be recommended to restart at least 1 IOP reducing drop d/c Ofloxacin Reduce: Pred to QD x1 wk then d/c Restart: Latanoprost QHS OS

RTC 1wk PO

## 2022-02-21 ENCOUNTER — OFFICE VISIT (OUTPATIENT)
Dept: URBAN - METROPOLITAN AREA CLINIC 48 | Facility: CLINIC | Age: 42
End: 2022-02-21
Payer: COMMERCIAL

## 2022-02-21 PROCEDURE — 99213 OFFICE O/P EST LOW 20 MIN: CPT | Performed by: OPHTHALMOLOGY

## 2022-02-21 ASSESSMENT — INTRAOCULAR PRESSURE: OS: 14

## 2022-02-21 NOTE — IMPRESSION/PLAN
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 Plan: IOP within excellent range in OS, within good range OD. Continue: Latanoprost QHS OS

## 2022-03-14 ENCOUNTER — OFFICE VISIT (OUTPATIENT)
Dept: URBAN - METROPOLITAN AREA CLINIC 48 | Facility: CLINIC | Age: 42
End: 2022-03-14
Payer: COMMERCIAL

## 2022-03-14 PROCEDURE — 99213 OFFICE O/P EST LOW 20 MIN: CPT | Performed by: OPHTHALMOLOGY

## 2022-03-14 ASSESSMENT — INTRAOCULAR PRESSURE
OS: 18
OD: 17

## 2022-03-14 NOTE — IMPRESSION/PLAN
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 Plan: IOP within excellent range in OS, within good range OD. Advised patient to use Pred every other day Restart: Combigan BID OS Continue: Latanoprost QHS OS

## 2022-05-25 ENCOUNTER — OFFICE VISIT (OUTPATIENT)
Dept: URBAN - METROPOLITAN AREA CLINIC 48 | Facility: CLINIC | Age: 42
End: 2022-05-25
Payer: COMMERCIAL

## 2022-05-25 DIAGNOSIS — H40.52X3 GLAUCOMA SECONDARY TO OTHER EYE DISORDERS, LEFT EYE, SEVERE STAGE: Primary | ICD-10-CM

## 2022-05-25 PROCEDURE — 92133 CPTRZD OPH DX IMG PST SGM ON: CPT | Performed by: OPHTHALMOLOGY

## 2022-05-25 PROCEDURE — 99214 OFFICE O/P EST MOD 30 MIN: CPT | Performed by: OPHTHALMOLOGY

## 2022-05-25 PROCEDURE — 92083 EXTENDED VISUAL FIELD XM: CPT | Performed by: OPHTHALMOLOGY

## 2022-05-25 RX ORDER — TIMOLOL MALEATE 5 MG/ML
0.5 % SOLUTION/ DROPS OPHTHALMIC
Qty: 10 | Refills: 5 | Status: ACTIVE
Start: 2022-05-25

## 2022-05-25 RX ORDER — LATANOPROST 50 UG/ML
0.005 % SOLUTION OPHTHALMIC
Qty: 5 | Refills: 6 | Status: ACTIVE
Start: 2022-05-25

## 2022-05-25 ASSESSMENT — INTRAOCULAR PRESSURE
OS: 14
OD: 13

## 2022-05-25 NOTE — IMPRESSION/PLAN
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 Plan: IOP within excellent range OU. Testing performed today, progression noted OS but likely due to previous uncontrolled IOP. Will change ocular treatment as Combigan is too expensive and not being covered by insurance. Start: Timolol BID OS Continue: Lumigan QHS OS

## 2022-07-12 ENCOUNTER — OFFICE VISIT (OUTPATIENT)
Dept: URBAN - METROPOLITAN AREA CLINIC 49 | Facility: CLINIC | Age: 42
End: 2022-07-12
Payer: COMMERCIAL

## 2022-07-12 DIAGNOSIS — H35.413 BILATERAL LATTICE DEGENERATION OF RETINAS: Primary | ICD-10-CM

## 2022-07-12 DIAGNOSIS — H40.9 UNSPECIFIED GLAUCOMA: ICD-10-CM

## 2022-07-12 PROCEDURE — 99203 OFFICE O/P NEW LOW 30 MIN: CPT | Performed by: OPHTHALMOLOGY

## 2022-07-12 ASSESSMENT — INTRAOCULAR PRESSURE
OS: 16
OD: 19

## 2022-07-12 NOTE — IMPRESSION/PLAN
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 Plan: He has a few small areas of lattice degeneration in the far periphery OU. At this time, no retinal tear or retinal detachment is identified. OCT confirms no CME/SRF OU. Retinal detachment symptoms were reviewed. Patient was encouraged to call our office if there is an increase in floaters, decrease in vision, or a shadow or curtain is noted in their peripheral vision. He will f/u with your excellent care. thanks [facility] PRN Prior notes from other provider(s) have been reviewed in conjunction with today's visit.

## 2022-08-25 ENCOUNTER — OFFICE VISIT (OUTPATIENT)
Dept: URBAN - METROPOLITAN AREA CLINIC 48 | Facility: CLINIC | Age: 42
End: 2022-08-25
Payer: COMMERCIAL

## 2022-08-25 DIAGNOSIS — H40.52X3 GLAUCOMA SECONDARY TO OTHER EYE DISORDERS, LEFT EYE, SEVERE STAGE: Primary | ICD-10-CM

## 2022-08-25 PROCEDURE — 99213 OFFICE O/P EST LOW 20 MIN: CPT | Performed by: OPHTHALMOLOGY

## 2022-08-25 ASSESSMENT — INTRAOCULAR PRESSURE
OS: 9
OD: 19

## 2022-08-25 NOTE — IMPRESSION/PLAN
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 Plan: IOP excellent to left eye, Will continue to monitor on current regimen. If IOP continues to lower will discontinue Timolol. Continue:
Timolol BID OS Prednisolone EOD OS 
D/C Latanoprost OS

RTC 4-6 weeks IOP check

## 2022-09-26 ENCOUNTER — OFFICE VISIT (OUTPATIENT)
Dept: URBAN - METROPOLITAN AREA CLINIC 48 | Facility: CLINIC | Age: 42
End: 2022-09-26
Payer: COMMERCIAL

## 2022-09-26 DIAGNOSIS — H40.52X3 GLAUCOMA SECONDARY TO OTHER EYE DISORDERS, LEFT EYE, SEVERE STAGE: Primary | ICD-10-CM

## 2022-09-26 PROCEDURE — 99213 OFFICE O/P EST LOW 20 MIN: CPT | Performed by: OPHTHALMOLOGY

## 2022-09-26 RX ORDER — PREDNISOLONE ACETATE 10 MG/ML
1 % SUSPENSION/ DROPS OPHTHALMIC
Qty: 5 | Refills: 5 | Status: ACTIVE
Start: 2022-09-26

## 2022-09-26 ASSESSMENT — INTRAOCULAR PRESSURE
OD: 18
OS: 11

## 2022-09-26 NOTE — IMPRESSION/PLAN
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 Plan: IOP in acceptable range. Will continue to monitor on current regimen. Continue:

Timolol BID OS Prednisolone EOD OS

## 2022-12-22 ENCOUNTER — OFFICE VISIT (OUTPATIENT)
Dept: URBAN - METROPOLITAN AREA CLINIC 48 | Facility: CLINIC | Age: 42
End: 2022-12-22
Payer: COMMERCIAL

## 2022-12-22 DIAGNOSIS — H40.52X3 GLAUCOMA SECONDARY TO OTHER EYE DISORDERS, LEFT EYE, SEVERE STAGE: Primary | ICD-10-CM

## 2022-12-22 PROCEDURE — 99213 OFFICE O/P EST LOW 20 MIN: CPT | Performed by: OPHTHALMOLOGY

## 2022-12-22 ASSESSMENT — INTRAOCULAR PRESSURE
OD: 17
OS: 11

## 2022-12-22 NOTE — IMPRESSION/PLAN
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 Plan: IOP within good range OU, stable. Continue on current treatment Continue: Timolol BID OS, Prednisolone EOD OS

## 2023-05-18 ENCOUNTER — OFFICE VISIT (OUTPATIENT)
Dept: URBAN - METROPOLITAN AREA CLINIC 48 | Facility: CLINIC | Age: 43
End: 2023-05-18

## 2023-05-18 DIAGNOSIS — H01.006 BLEPHARITIS OF LT EYELID: Primary | ICD-10-CM

## 2023-05-18 DIAGNOSIS — H40.52X3 GLAUCOMA SECONDARY TO OTHER EYE DISORDERS, LEFT EYE, SEVERE STAGE: ICD-10-CM

## 2023-05-18 PROCEDURE — 99213 OFFICE O/P EST LOW 20 MIN: CPT | Performed by: OPHTHALMOLOGY

## 2023-05-18 RX ORDER — TOBRAMYCIN AND DEXAMETHASONE 3; 1 MG/G; MG/G
OINTMENT OPHTHALMIC
Qty: 3.5 | Refills: 3 | Status: ACTIVE
Start: 2023-05-18

## 2023-05-18 ASSESSMENT — INTRAOCULAR PRESSURE
OD: 17
OS: 10

## 2023-05-18 NOTE — IMPRESSION/PLAN
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 Plan: IOP within good range OU, stable. Continue on current treatment Continue: Timolol BID OS

RTC 4-6 months IOP check, VF and OCT RNFL

## 2023-05-18 NOTE — IMPRESSION/PLAN
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 Patient to wash eyelids with baby shampoo and OTC AFT.  

Start: Tobradex QHS OS

## 2023-10-16 ENCOUNTER — OFFICE VISIT (OUTPATIENT)
Dept: URBAN - METROPOLITAN AREA CLINIC 48 | Facility: CLINIC | Age: 43
End: 2023-10-16
Payer: COMMERCIAL

## 2023-10-16 DIAGNOSIS — H40.52X3 GLAUCOMA SECONDARY TO OTHER EYE DISORDERS, LEFT EYE, SEVERE STAGE: Primary | ICD-10-CM

## 2023-10-16 PROCEDURE — 92133 CPTRZD OPH DX IMG PST SGM ON: CPT | Performed by: OPHTHALMOLOGY

## 2023-10-16 PROCEDURE — 99214 OFFICE O/P EST MOD 30 MIN: CPT | Performed by: OPHTHALMOLOGY

## 2023-10-16 PROCEDURE — 92083 EXTENDED VISUAL FIELD XM: CPT | Performed by: OPHTHALMOLOGY

## 2023-10-16 ASSESSMENT — INTRAOCULAR PRESSURE
OS: 12
OD: 21

## 2024-01-24 ENCOUNTER — OFFICE VISIT (OUTPATIENT)
Dept: URBAN - METROPOLITAN AREA CLINIC 48 | Facility: CLINIC | Age: 44
End: 2024-01-24
Payer: COMMERCIAL

## 2024-01-24 DIAGNOSIS — H40.52X3 GLAUCOMA SECONDARY TO OTHER EYE DISORDERS, LEFT EYE, SEVERE STAGE: Primary | ICD-10-CM

## 2024-01-24 DIAGNOSIS — H01.006 BLEPHARITIS OF LT EYELID: ICD-10-CM

## 2024-01-24 PROCEDURE — 99213 OFFICE O/P EST LOW 20 MIN: CPT | Performed by: OPHTHALMOLOGY

## 2024-01-24 ASSESSMENT — INTRAOCULAR PRESSURE
OS: 12
OD: 19

## 2024-04-24 ENCOUNTER — OFFICE VISIT (OUTPATIENT)
Dept: URBAN - METROPOLITAN AREA CLINIC 48 | Facility: CLINIC | Age: 44
End: 2024-04-24
Payer: COMMERCIAL

## 2024-04-24 DIAGNOSIS — H44.19 OTHER ENDOPHTHALMITIS: Primary | ICD-10-CM

## 2024-04-24 DIAGNOSIS — H40.52X3 GLAUCOMA SECONDARY TO OTHER EYE DISORDERS, LEFT EYE, SEVERE STAGE: ICD-10-CM

## 2024-04-24 PROCEDURE — 99213 OFFICE O/P EST LOW 20 MIN: CPT | Performed by: OPHTHALMOLOGY

## 2024-04-24 ASSESSMENT — INTRAOCULAR PRESSURE
OS: 3
OD: 11

## 2024-04-29 ENCOUNTER — OFFICE VISIT (OUTPATIENT)
Dept: URBAN - METROPOLITAN AREA CLINIC 47 | Facility: CLINIC | Age: 44
End: 2024-04-29
Payer: COMMERCIAL

## 2024-04-29 DIAGNOSIS — H40.52X3 GLAUCOMA SECONDARY TO OTHER EYE DISORDERS, LEFT EYE, SEVERE STAGE: ICD-10-CM

## 2024-04-29 DIAGNOSIS — H35.413 BILATERAL LATTICE DEGENERATION OF RETINAS: ICD-10-CM

## 2024-04-29 DIAGNOSIS — H20.9 UNSPECIFIED IRIDOCYCLITIS: Primary | ICD-10-CM

## 2024-04-29 PROCEDURE — 92134 CPTRZ OPH DX IMG PST SGM RTA: CPT | Performed by: OPHTHALMOLOGY

## 2024-04-29 PROCEDURE — 76512 OPH US DX B-SCAN: CPT | Performed by: OPHTHALMOLOGY

## 2024-04-29 PROCEDURE — 99214 OFFICE O/P EST MOD 30 MIN: CPT | Performed by: OPHTHALMOLOGY

## 2024-04-29 RX ORDER — MOXIFLOXACIN HYDROCHLORIDE 5 MG/ML
0.5 % SOLUTION/ DROPS OPHTHALMIC
Qty: 5 | Refills: 1 | Status: INACTIVE
Start: 2024-04-29 | End: 2024-05-01

## 2024-04-29 RX ORDER — PREDNISOLONE ACETATE 10 MG/ML
1 % SUSPENSION/ DROPS OPHTHALMIC
Qty: 5 | Refills: 1 | Status: INACTIVE
Start: 2024-04-29 | End: 2024-04-29

## 2024-04-29 ASSESSMENT — INTRAOCULAR PRESSURE
OS: 18
OD: 16

## 2024-04-30 ENCOUNTER — OFFICE VISIT (OUTPATIENT)
Dept: URBAN - METROPOLITAN AREA CLINIC 48 | Facility: CLINIC | Age: 44
End: 2024-04-30
Payer: COMMERCIAL

## 2024-04-30 PROCEDURE — 99213 OFFICE O/P EST LOW 20 MIN: CPT | Performed by: OPHTHALMOLOGY

## 2024-04-30 RX ORDER — DIFLUPREDNATE OPHTHALMIC 0.5 MG/ML
0.05 % EMULSION OPHTHALMIC
Qty: 10 | Refills: 1 | Status: INACTIVE
Start: 2024-04-30 | End: 2024-05-01

## 2024-04-30 ASSESSMENT — INTRAOCULAR PRESSURE
OD: 19
OS: 17

## 2024-05-01 ENCOUNTER — OFFICE VISIT (OUTPATIENT)
Dept: URBAN - METROPOLITAN AREA CLINIC 48 | Facility: CLINIC | Age: 44
End: 2024-05-01
Payer: COMMERCIAL

## 2024-05-01 PROCEDURE — 99214 OFFICE O/P EST MOD 30 MIN: CPT | Performed by: OPHTHALMOLOGY

## 2024-05-01 RX ORDER — DIFLUPREDNATE OPHTHALMIC 0.5 MG/ML
0.05 % EMULSION OPHTHALMIC
Qty: 10 | Refills: 1 | Status: ACTIVE
Start: 2024-05-01

## 2024-05-01 RX ORDER — MOXIFLOXACIN HYDROCHLORIDE 5 MG/ML
0.5 % SOLUTION/ DROPS OPHTHALMIC
Qty: 5 | Refills: 1 | Status: ACTIVE
Start: 2024-05-01

## 2024-05-01 ASSESSMENT — INTRAOCULAR PRESSURE
OD: 13
OS: 14

## 2024-05-06 ENCOUNTER — OFFICE VISIT (OUTPATIENT)
Dept: URBAN - METROPOLITAN AREA CLINIC 47 | Facility: CLINIC | Age: 44
End: 2024-05-06
Payer: COMMERCIAL

## 2024-05-06 DIAGNOSIS — H18.20 CORNEAL EDEMA: ICD-10-CM

## 2024-05-06 DIAGNOSIS — H35.413 BILATERAL LATTICE DEGENERATION OF RETINAS: ICD-10-CM

## 2024-05-06 DIAGNOSIS — H40.52X3 GLAUCOMA SECONDARY TO OTHER EYE DISORDERS, LEFT EYE, SEVERE STAGE: ICD-10-CM

## 2024-05-06 DIAGNOSIS — H20.9 UNSPECIFIED IRIDOCYCLITIS: Primary | ICD-10-CM

## 2024-05-06 PROCEDURE — 92134 CPTRZ OPH DX IMG PST SGM RTA: CPT | Performed by: OPHTHALMOLOGY

## 2024-05-06 PROCEDURE — 99214 OFFICE O/P EST MOD 30 MIN: CPT | Performed by: OPHTHALMOLOGY

## 2024-05-06 PROCEDURE — 76512 OPH US DX B-SCAN: CPT | Performed by: OPHTHALMOLOGY

## 2024-05-06 RX ORDER — VALACYCLOVIR HYDROCHLORIDE 1 G/1
TABLET, FILM COATED ORAL
Qty: 21 | Refills: 3 | Status: INACTIVE
Start: 2024-05-06 | End: 2024-05-13

## 2024-05-06 ASSESSMENT — INTRAOCULAR PRESSURE
OS: 11
OD: 14

## 2024-05-09 ENCOUNTER — OFFICE VISIT (OUTPATIENT)
Dept: URBAN - METROPOLITAN AREA CLINIC 48 | Facility: CLINIC | Age: 44
End: 2024-05-09
Payer: COMMERCIAL

## 2024-05-09 PROCEDURE — 99213 OFFICE O/P EST LOW 20 MIN: CPT | Performed by: OPHTHALMOLOGY

## 2024-05-09 ASSESSMENT — INTRAOCULAR PRESSURE
OD: 16
OS: 12

## 2024-05-21 ENCOUNTER — OFFICE VISIT (OUTPATIENT)
Dept: URBAN - METROPOLITAN AREA CLINIC 48 | Facility: CLINIC | Age: 44
End: 2024-05-21
Payer: COMMERCIAL

## 2024-05-21 DIAGNOSIS — H20.9 UNSPECIFIED IRIDOCYCLITIS: Primary | ICD-10-CM

## 2024-05-21 DIAGNOSIS — H18.20 CORNEAL EDEMA: ICD-10-CM

## 2024-05-21 PROCEDURE — 99213 OFFICE O/P EST LOW 20 MIN: CPT | Performed by: OPHTHALMOLOGY

## 2024-05-21 ASSESSMENT — INTRAOCULAR PRESSURE
OS: 10
OD: 13

## 2024-06-03 ENCOUNTER — OFFICE VISIT (OUTPATIENT)
Dept: URBAN - METROPOLITAN AREA CLINIC 47 | Facility: CLINIC | Age: 44
End: 2024-06-03
Payer: COMMERCIAL

## 2024-06-03 DIAGNOSIS — H20.9 UNSPECIFIED IRIDOCYCLITIS: Primary | ICD-10-CM

## 2024-06-03 DIAGNOSIS — H40.52X3 GLAUCOMA SECONDARY TO OTHER EYE DISORDERS, LEFT EYE, SEVERE STAGE: ICD-10-CM

## 2024-06-03 DIAGNOSIS — H18.20 CORNEAL EDEMA: ICD-10-CM

## 2024-06-03 DIAGNOSIS — H35.413 BILATERAL LATTICE DEGENERATION OF RETINAS: ICD-10-CM

## 2024-06-03 PROCEDURE — 92134 CPTRZ OPH DX IMG PST SGM RTA: CPT | Performed by: OPHTHALMOLOGY

## 2024-06-03 PROCEDURE — 76512 OPH US DX B-SCAN: CPT | Performed by: OPHTHALMOLOGY

## 2024-06-03 PROCEDURE — 99213 OFFICE O/P EST LOW 20 MIN: CPT | Performed by: OPHTHALMOLOGY

## 2024-06-03 ASSESSMENT — INTRAOCULAR PRESSURE
OD: 21
OS: 13

## 2024-06-04 ENCOUNTER — OFFICE VISIT (OUTPATIENT)
Dept: URBAN - METROPOLITAN AREA CLINIC 48 | Facility: CLINIC | Age: 44
End: 2024-06-04
Payer: COMMERCIAL

## 2024-06-04 PROCEDURE — 99213 OFFICE O/P EST LOW 20 MIN: CPT | Performed by: OPHTHALMOLOGY

## 2024-06-04 RX ORDER — VALACYCLOVIR HYDROCHLORIDE 1 G/1
TABLET, FILM COATED ORAL
Qty: 90 | Refills: 2 | Status: ACTIVE
Start: 2024-06-04

## 2024-06-04 ASSESSMENT — INTRAOCULAR PRESSURE
OS: 17
OD: 20

## 2024-06-21 ENCOUNTER — OFFICE VISIT (OUTPATIENT)
Dept: URBAN - METROPOLITAN AREA CLINIC 48 | Facility: CLINIC | Age: 44
End: 2024-06-21
Payer: COMMERCIAL

## 2024-06-21 DIAGNOSIS — B00.51 HERPESVIRAL IRIDOCYCLITIS: Primary | ICD-10-CM

## 2024-06-21 PROCEDURE — 99213 OFFICE O/P EST LOW 20 MIN: CPT | Performed by: OPHTHALMOLOGY

## 2024-06-21 ASSESSMENT — INTRAOCULAR PRESSURE
OS: 18
OD: 19

## 2024-11-26 ENCOUNTER — OFFICE VISIT (OUTPATIENT)
Dept: URBAN - METROPOLITAN AREA CLINIC 48 | Facility: CLINIC | Age: 44
End: 2024-11-26
Payer: COMMERCIAL

## 2024-11-26 DIAGNOSIS — H40.52X3 GLAUCOMA SECONDARY TO OTHER EYE DISORDERS, LEFT EYE, SEVERE STAGE: Primary | ICD-10-CM

## 2024-11-26 PROCEDURE — 99213 OFFICE O/P EST LOW 20 MIN: CPT | Performed by: OPHTHALMOLOGY

## 2024-12-06 ENCOUNTER — POST-OPERATIVE VISIT (OUTPATIENT)
Dept: URBAN - METROPOLITAN AREA CLINIC 46 | Facility: CLINIC | Age: 44
End: 2024-12-06
Payer: COMMERCIAL

## 2024-12-06 DIAGNOSIS — Z48.810 ENCOUNTER FOR SURGICAL AFTERCARE FOLLOWING SURGERY ON A SENSE ORGAN: Primary | ICD-10-CM

## 2024-12-06 PROCEDURE — 99024 POSTOP FOLLOW-UP VISIT: CPT | Performed by: OPHTHALMOLOGY

## 2024-12-06 RX ORDER — PREDNISOLONE ACETATE 10 MG/ML
1 % SUSPENSION/ DROPS OPHTHALMIC
Qty: 5 | Refills: 1 | Status: ACTIVE
Start: 2024-12-06

## 2024-12-06 ASSESSMENT — INTRAOCULAR PRESSURE: OS: 5

## 2024-12-12 ENCOUNTER — POST-OPERATIVE VISIT (OUTPATIENT)
Dept: URBAN - METROPOLITAN AREA CLINIC 48 | Facility: CLINIC | Age: 44
End: 2024-12-12
Payer: COMMERCIAL

## 2024-12-12 DIAGNOSIS — Z48.810 ENCOUNTER FOR SURGICAL AFTERCARE FOLLOWING SURGERY ON A SENSE ORGAN: Primary | ICD-10-CM

## 2024-12-12 PROCEDURE — 99024 POSTOP FOLLOW-UP VISIT: CPT | Performed by: OPHTHALMOLOGY

## 2024-12-12 ASSESSMENT — INTRAOCULAR PRESSURE: OS: 8

## 2024-12-24 ENCOUNTER — POST-OPERATIVE VISIT (OUTPATIENT)
Dept: URBAN - METROPOLITAN AREA CLINIC 48 | Facility: CLINIC | Age: 44
End: 2024-12-24
Payer: COMMERCIAL

## 2024-12-24 DIAGNOSIS — Z48.810 ENCOUNTER FOR SURGICAL AFTERCARE FOLLOWING SURGERY ON A SENSE ORGAN: Primary | ICD-10-CM

## 2024-12-24 PROCEDURE — 99024 POSTOP FOLLOW-UP VISIT: CPT | Performed by: OPHTHALMOLOGY

## 2024-12-24 RX ORDER — TIMOLOL MALEATE 5 MG/ML
0.5 % SOLUTION/ DROPS OPHTHALMIC
Qty: 10 | Refills: 2 | Status: ACTIVE
Start: 2024-12-24

## 2024-12-24 ASSESSMENT — INTRAOCULAR PRESSURE: OS: 13

## 2025-07-29 ENCOUNTER — OFFICE VISIT (OUTPATIENT)
Dept: URBAN - METROPOLITAN AREA CLINIC 48 | Facility: CLINIC | Age: 45
End: 2025-07-29
Payer: COMMERCIAL

## 2025-07-29 DIAGNOSIS — Z94.7 CORNEAL TRANSPLANT STATUS: Primary | ICD-10-CM

## 2025-07-29 PROCEDURE — 99213 OFFICE O/P EST LOW 20 MIN: CPT | Performed by: OPHTHALMOLOGY

## 2025-07-29 RX ORDER — ERYTHROMYCIN 5 MG/G
OINTMENT OPHTHALMIC
Qty: 5 | Refills: 3 | Status: ACTIVE
Start: 2025-07-29

## 2025-08-12 ENCOUNTER — OFFICE VISIT (OUTPATIENT)
Dept: URBAN - METROPOLITAN AREA CLINIC 48 | Facility: CLINIC | Age: 45
End: 2025-08-12
Payer: COMMERCIAL

## 2025-08-12 DIAGNOSIS — Z94.7 CORNEAL TRANSPLANT STATUS: Primary | ICD-10-CM

## 2025-08-12 DIAGNOSIS — H40.52X3 GLAUCOMA SECONDARY TO OTHER EYE DISORDERS, LEFT EYE, SEVERE STAGE: ICD-10-CM

## 2025-08-12 PROCEDURE — 99213 OFFICE O/P EST LOW 20 MIN: CPT | Performed by: OPHTHALMOLOGY
